# Patient Record
Sex: FEMALE | Race: WHITE | NOT HISPANIC OR LATINO | Employment: OTHER | URBAN - METROPOLITAN AREA
[De-identification: names, ages, dates, MRNs, and addresses within clinical notes are randomized per-mention and may not be internally consistent; named-entity substitution may affect disease eponyms.]

---

## 2017-01-09 NOTE — PATIENT DISCUSSION
(H00.14) Chalazion left upper eyelid - Assesment : Examination revealed Chalazion. - Plan : Monitor for changes, call if no improvement.  Prescribed warm compresses BID-TID OS Start Maxitrol Bid OS for 10 days Rtc as scheduled

## 2018-11-05 NOTE — PATIENT DISCUSSION
(Y28.759) Keratoconjunct sicca, not specified as Sjogren's, bilateral - Assesment : Examination revealed Dry Eye Syndrome WITH POSSIBLE ALLERGY COMPONENT - Plan : RECOMMEND ZATIDOR OR ALAWAY DROPS FOR 2-3 WEEKS AND SYSTANE GEL AT NIGHT  1 YEAR EXAM

## 2018-11-05 NOTE — PATIENT DISCUSSION
(H11.805) Conjunctival cysts, right eye - Assesment : Examination revealed a conjunctival cyst.  NASAL - Plan : LYSED AT SLITLAMP WITH 21G NEEDLE

## 2018-11-29 NOTE — PATIENT DISCUSSION
(L91.8) Other hypertrophic disorders of the skin - Assesment : CROWS FEET  GLABELLAR AREA  FOREHEAD - Plan : BOTOX ADMINISTERED TODAY 35 UNTIS  CONSENT OBTAINED.

## 2020-03-13 NOTE — PATIENT DISCUSSION
Start Keflex 250 mg 1 T PO BID and erythromycin QHS OS. Advised to change out eye makeup after starting treatment.

## 2022-03-24 ENCOUNTER — NEW PATIENT (OUTPATIENT)
Dept: URBAN - METROPOLITAN AREA CLINIC 39 | Facility: CLINIC | Age: 67
End: 2022-03-24

## 2022-03-24 DIAGNOSIS — H25.813: ICD-10-CM

## 2022-03-24 DIAGNOSIS — H35.3131: ICD-10-CM

## 2022-03-24 DIAGNOSIS — H43.813: ICD-10-CM

## 2022-03-24 PROCEDURE — 92015 DETERMINE REFRACTIVE STATE: CPT

## 2022-03-24 PROCEDURE — 92004 COMPRE OPH EXAM NEW PT 1/>: CPT

## 2022-03-24 ASSESSMENT — VISUAL ACUITY
OU_SC: J1+
OS_CC: 20/30-2
OD_SC: J1+
OS_SC: 20/200+1
OU_SC: 20/80
OU_CC: 20/20-1
OS_SC: J1+
OD_SC: 20/200
OD_CC: 20/25

## 2022-03-24 ASSESSMENT — TONOMETRY
OD_IOP_MMHG: 18
OS_IOP_MMHG: 18

## 2022-09-19 NOTE — PROCEDURE NOTE: CLINICAL
PROCEDURE NOTE: Punctal Plugs, Quintess Dissolvable (58639D, [id number]) Bilateral Lower Lids. Diagnosis: Dry Eye Syndrome. Prior to treatment, the risks/benefits/alternatives were discussed. The patient wished to proceed with procedure. A time out to confirm the patient, site, and procedure has been achieved. The site has been marked. Temporary collagen plugs were inserted. Patient tolerated procedure well. There were no complications. Post procedure instructions given. Ling Pedraza

## 2022-09-19 NOTE — PATIENT DISCUSSION
Discussed punctal plugs with patient. Patient wishes to proceed at this time with a verbal consent .

## 2022-09-19 NOTE — PATIENT DISCUSSION
Artificial Tears: One drop to both eyes 3-4 times prn. We recommend Systane or Refresh lubricating eye drops which can be found at any pharmacy.

## 2022-11-30 NOTE — PATIENT DISCUSSION
most likely from incomplete lid closure when sleeping. to use refresh pm ointment qhs, art tears q1-2h daily. may need sleep mask.

## 2022-11-30 NOTE — PATIENT DISCUSSION
Artificial Tears: One drop to both eyes q1-2h. We recommend Systane or Refresh lubricating eye drops which can be found at any pharmacy.
